# Patient Record
Sex: FEMALE | Race: WHITE | NOT HISPANIC OR LATINO | Employment: STUDENT | URBAN - METROPOLITAN AREA
[De-identification: names, ages, dates, MRNs, and addresses within clinical notes are randomized per-mention and may not be internally consistent; named-entity substitution may affect disease eponyms.]

---

## 2024-09-03 ENCOUNTER — TELEPHONE (OUTPATIENT)
Age: 29
End: 2024-09-03

## 2024-09-03 ENCOUNTER — PATIENT MESSAGE (OUTPATIENT)
Dept: INTERNAL MEDICINE CLINIC | Facility: OTHER | Age: 29
End: 2024-09-03

## 2024-09-03 DIAGNOSIS — F90.0 ATTENTION DEFICIT HYPERACTIVITY DISORDER (ADHD), PREDOMINANTLY INATTENTIVE TYPE: Primary | ICD-10-CM

## 2024-09-03 DIAGNOSIS — F90.0 ATTENTION DEFICIT HYPERACTIVITY DISORDER (ADHD), PREDOMINANTLY INATTENTIVE TYPE: ICD-10-CM

## 2024-09-03 DIAGNOSIS — F41.9 ANXIETY: ICD-10-CM

## 2024-09-03 RX ORDER — ESCITALOPRAM OXALATE 10 MG/1
10 TABLET ORAL DAILY
Qty: 90 TABLET | Refills: 1 | Status: SHIPPED | OUTPATIENT
Start: 2024-09-03 | End: 2025-03-02

## 2024-09-03 RX ORDER — LISDEXAMFETAMINE DIMESYLATE 50 MG/1
50 CAPSULE ORAL EVERY MORNING
Qty: 15 CAPSULE | Refills: 0 | Status: SHIPPED | OUTPATIENT
Start: 2024-09-03

## 2024-09-03 NOTE — TELEPHONE ENCOUNTER
Medication: escitalopram (LEXAPRO) 10 mg tablet     Dose/Frequency: Take 1 tablet (10 mg total) by mouth daily     Quantity:  90 tablet     Pharmacy: [facility] phone [phone]  Fax [phone]  (Please do not send this rx to [facility], ins called to inform her she is unable to have this med sent to [facility], it must go to [facility], thank you.)    Office:   [x] PCP/Provider -   [] Speciality/Provider -     Does the patient have enough for 3 days?   [] Yes   [x] No - Send as HP to POD

## 2024-09-03 NOTE — TELEPHONE ENCOUNTER
Pt is requesting a different alternative for amphetamine-dextroamphetamine (ADDERALL, 30MG,) 30 MG tablet as she has been told numerous times that it is on back order for several weeks. Pt asking if Vyvanse can be prescribed as it is in the same class. Pt took her last Adderall on 9/2 and needs rx.   Pt also requesting a call back to discuss [phone]   Pt stated rx needs to be sent to [facility] per insurance, not [facility].

## 2024-09-11 ENCOUNTER — APPOINTMENT (OUTPATIENT)
Dept: LAB | Facility: CLINIC | Age: 29
End: 2024-09-11

## 2024-09-11 DIAGNOSIS — Z00.8 HEALTH EXAMINATION IN POPULATION SURVEYS: ICD-10-CM

## 2024-09-11 LAB
CHOLEST SERPL-MCNC: 169 MG/DL
EST. AVERAGE GLUCOSE BLD GHB EST-MCNC: 97 MG/DL
HBA1C MFR BLD: 5 %
HDLC SERPL-MCNC: 60 MG/DL
LDLC SERPL CALC-MCNC: 84 MG/DL (ref 0–100)
NONHDLC SERPL-MCNC: 109 MG/DL
TRIGL SERPL-MCNC: 126 MG/DL

## 2024-09-11 PROCEDURE — 36415 COLL VENOUS BLD VENIPUNCTURE: CPT

## 2024-09-11 PROCEDURE — 80061 LIPID PANEL: CPT

## 2024-09-11 PROCEDURE — 83036 HEMOGLOBIN GLYCOSYLATED A1C: CPT

## 2024-10-02 RX ORDER — DEXTROAMPHETAMINE SACCHARATE, AMPHETAMINE ASPARTATE, DEXTROAMPHETAMINE SULFATE AND AMPHETAMINE SULFATE 7.5; 7.5; 7.5; 7.5 MG/1; MG/1; MG/1; MG/1
30 TABLET ORAL 2 TIMES DAILY
Qty: 60 TABLET | Refills: 0 | Status: SHIPPED | OUTPATIENT
Start: 2024-10-02

## 2024-10-30 DIAGNOSIS — F90.0 ATTENTION DEFICIT HYPERACTIVITY DISORDER (ADHD), PREDOMINANTLY INATTENTIVE TYPE: ICD-10-CM

## 2024-10-31 RX ORDER — DEXTROAMPHETAMINE SACCHARATE, AMPHETAMINE ASPARTATE, DEXTROAMPHETAMINE SULFATE AND AMPHETAMINE SULFATE 7.5; 7.5; 7.5; 7.5 MG/1; MG/1; MG/1; MG/1
30 TABLET ORAL 2 TIMES DAILY
Qty: 60 TABLET | Refills: 0 | Status: SHIPPED | OUTPATIENT
Start: 2024-10-31

## 2024-11-22 ENCOUNTER — ANNUAL EXAM (OUTPATIENT)
Dept: OBGYN CLINIC | Facility: CLINIC | Age: 29
End: 2024-11-22
Payer: COMMERCIAL

## 2024-11-22 VITALS
DIASTOLIC BLOOD PRESSURE: 80 MMHG | WEIGHT: 131.8 LBS | SYSTOLIC BLOOD PRESSURE: 122 MMHG | BODY MASS INDEX: 26.57 KG/M2 | HEIGHT: 59 IN

## 2024-11-22 DIAGNOSIS — Z01.419 WELL WOMAN EXAM WITH ROUTINE GYNECOLOGICAL EXAM: Primary | ICD-10-CM

## 2024-11-22 PROCEDURE — S0612 ANNUAL GYNECOLOGICAL EXAMINA: HCPCS | Performed by: PHYSICIAN ASSISTANT

## 2024-11-22 NOTE — PROGRESS NOTES
Assessment   29 y.o.  presenting for annual exam.     Plan   Diagnoses and all orders for this visit:    Well woman exam with routine gynecological exam        Pap up to date    Perineal hygiene reviewed. Weight bearing exercises minium of 150 mins/weekly advised. Kegel exercises recommended.   SBE encouraged, A yearly mammogram is recommended for breast cancer screening starting at age 40. ASCCP guidelines reviewed. Condoms encouraged with all sexual activity to prevent STI's. Gardisil vaccines recommended up to age 45. Calcium/ Vit D dietary requirements discussed.   Advised to call with any issues, all concerns & questions addressed.   See provided information in your after visit summary     F/U Annually and PRN    Results will be released to [facility], if abnormal will call or message to review and discuss treatment plan.     __________________________________________________________________    Subjective     Oliva Storey is a 29 y.o.  presenting for annual exam. She is without complaint and does not want STD testing today.     SCREENING  Last Pap: 2023 negative     GYN  The patient's menstrual history is as follows:    ;  ; Period Cycle (Days): 28; Period Duration (Days): 4-5; Period Pattern: Regular; Menstrual Flow: Heavy; Dysmenorrhea: (!) Moderate; Dysmenorrhea Symptoms: Cramping, Nausea, Headache    Sexually active: Yes - single partner - female  Contraception: none   Hx STI: denies     Hx Abnormal pap: denies  We reviewed ASCCP guidelines for Pap testing today.  Gardasil: She is unsure if she completed the Gardasil series.    Denies vaginal discharge, itching, odor, dyspareunia, pelvic pain and vulvar/vaginal symptoms      OB           Complaints: denies   Denies urgency, frequency, hematuria, leakage / change in stream, difficulty urinating.       BREAST  Complaints: denies   Denies: breast lump, breast tenderness, nipple discharge, skin color change, and skin lesion(s)  Personal  hx: none      Pertinent Family Hx:   Family hx of breast cancer: no  Family hx of ovarian cancer: no  Family hx of colon cancer: no      GENERAL  PMH reviewed/updated and is as below.   Patient does follow with a PCP.    SOCIAL  Smoking: no  Alcohol:occasional   Drug: no  Occupation: PT[employer]       Past Medical History:   Diagnosis Date    Allergic 12/01/21    Anxiety 2010       History reviewed. No pertinent surgical history.      Current Outpatient Medications:     amphetamine-dextroamphetamine (ADDERALL, 30MG,) 30 MG tablet, Take 1 tablet (30 mg total) by mouth 2 (two) times a day Max Daily Amount: 60 mg, Disp: 60 tablet, Rfl: 0    escitalopram (LEXAPRO) 10 mg tablet, Take 1 tablet (10 mg total) by mouth daily, Disp: 90 tablet, Rfl: 1    fluticasone (FLONASE) 50 mcg/act nasal spray, 1 spray into each nostril as needed in the morning for rhinitis or allergies., Disp: 18 mL, Rfl: 3    ibuprofen (MOTRIN) 200 mg tablet, Take by mouth every 6 (six) hours as needed for mild pain, Disp: , Rfl:     montelukast (SINGULAIR) 10 mg tablet, Take 1 tablet (10 mg total) by mouth daily at bedtime, Disp: 30 tablet, Rfl: 5    No Known Allergies    Social History     Socioeconomic History    Marital status: /Civil Union     Spouse name: Not on file    Number of children: Not on file    Years of education: Not on file    Highest education level: Not on file   Occupational History    Not on file   Tobacco Use    Smoking status: Never    Smokeless tobacco: Never   Vaping Use    Vaping status: Never Used   Substance and Sexual Activity    Alcohol use: Yes     Comment: occassional    Drug use: Never    Sexual activity: Yes     Partners: Female     Birth control/protection: None   Other Topics Concern    Not on file   Social History Narrative    Not on file     Social Drivers of Health     Financial Resource Strain: Not on file   Food Insecurity: Not on file   Transportation Needs: Not on file   Physical Activity:  "Not on file   Stress: Not on file   Social Connections: Not on file   Intimate Partner Violence: Not on file   Housing Stability: Not on file       Review of Systems     ROS:  Constitutional: Negative for fatigue and unexpected weight change.   Respiratory: Negative for cough and shortness of breath.    Cardiovascular: Negative for chest pain and palpitations.   Gastrointestinal: Negative for abdominal pain and change in bowel habits  Breasts:  Negative, other than as noted above.   Genitourinary: Negative, other than as noted above.   Psychiatric: Negative for mood difficulties.      Objective      /80 (BP Location: Left arm, Patient Position: Sitting, Cuff Size: Standard)   Ht 4' 11\" (1.499 m)   Wt 59.8 kg (131 lb 12.8 oz)   LMP 11/01/2024 (Exact Date)   BMI 26.62 kg/m²     Physical Examination:    Patient appears well and is not in distress  Neck is supple without masses, no cervical or supraclavicular lymphadenopathy  Cardiovascular: regular rate and rhythm; no murmurs  Lungs: clear to auscultation bilaterally; no wheezes  Breasts are symmetrical without mass, tenderness, nipple discharge, skin changes or adenopathy.   Abdomen is soft and nontender without masses.   External genitals are normal without lesions or rashes.  Urethral meatus and urethra are normal  Bladder is normal to palpation  Vagina is normal without discharge or bleeding.   Cervix is normal without discharge or lesion.   Uterus is normal, mobile, nontender without palpable mass.  Adnexa are normal, nontender, without palpable mass.                 "

## 2024-12-02 DIAGNOSIS — F41.9 ANXIETY: ICD-10-CM

## 2024-12-02 DIAGNOSIS — F90.0 ATTENTION DEFICIT HYPERACTIVITY DISORDER (ADHD), PREDOMINANTLY INATTENTIVE TYPE: ICD-10-CM

## 2024-12-02 RX ORDER — ESCITALOPRAM OXALATE 10 MG/1
10 TABLET ORAL DAILY
Qty: 90 TABLET | Refills: 1 | Status: SHIPPED | OUTPATIENT
Start: 2024-12-02 | End: 2025-05-31

## 2024-12-03 RX ORDER — DEXTROAMPHETAMINE SACCHARATE, AMPHETAMINE ASPARTATE, DEXTROAMPHETAMINE SULFATE AND AMPHETAMINE SULFATE 7.5; 7.5; 7.5; 7.5 MG/1; MG/1; MG/1; MG/1
30 TABLET ORAL 2 TIMES DAILY
Qty: 60 TABLET | Refills: 0 | Status: SHIPPED | OUTPATIENT
Start: 2024-12-03

## 2024-12-17 ENCOUNTER — OFFICE VISIT (OUTPATIENT)
Dept: INTERNAL MEDICINE CLINIC | Facility: OTHER | Age: 29
End: 2024-12-17
Payer: COMMERCIAL

## 2024-12-17 VITALS
HEIGHT: 59 IN | OXYGEN SATURATION: 100 % | HEART RATE: 85 BPM | WEIGHT: 130 LBS | SYSTOLIC BLOOD PRESSURE: 120 MMHG | TEMPERATURE: 98.2 F | BODY MASS INDEX: 26.21 KG/M2 | DIASTOLIC BLOOD PRESSURE: 78 MMHG | RESPIRATION RATE: 18 BRPM

## 2024-12-17 DIAGNOSIS — I73.00 RAYNAUD'S PHENOMENON WITHOUT GANGRENE: ICD-10-CM

## 2024-12-17 DIAGNOSIS — F90.0 ATTENTION DEFICIT HYPERACTIVITY DISORDER (ADHD), PREDOMINANTLY INATTENTIVE TYPE: ICD-10-CM

## 2024-12-17 DIAGNOSIS — E80.4 GILBERT SYNDROME: ICD-10-CM

## 2024-12-17 DIAGNOSIS — F41.9 ANXIETY: Primary | ICD-10-CM

## 2024-12-17 DIAGNOSIS — J30.9 ALLERGIC RHINITIS, UNSPECIFIED SEASONALITY, UNSPECIFIED TRIGGER: ICD-10-CM

## 2024-12-17 PROCEDURE — 99214 OFFICE O/P EST MOD 30 MIN: CPT | Performed by: INTERNAL MEDICINE

## 2024-12-17 RX ORDER — ESCITALOPRAM OXALATE 10 MG/1
15 TABLET ORAL DAILY
Start: 2024-12-17 | End: 2025-06-15

## 2024-12-17 NOTE — PROGRESS NOTES
Name: Oliva Storey      : 1995      MRN: [id number]  Encounter Provider: Raji Lou MD  Encounter Date: 2024   Encounter department: [facility]  :  Assessment & Plan  Anxiety  Will continue Lexapro at 15 mg daily.   Orders:  •  escitalopram (LEXAPRO) 10 mg tablet; Take 1.5 tablets (15 mg total) by mouth daily  •  Comprehensive metabolic panel; Future    Attention deficit hyperactivity disorder (ADHD), predominantly inattentive type  Continue Adderall 30 mg BID.   Orders:  •  Comprehensive metabolic panel; Future    Raynaud's phenomenon without gangrene  Will check an NURYS.  Discussed keeping extremities warm.  Orders:  •  Comprehensive metabolic panel; Future  •  NURYS Screen w/Reflex Cascade; Future    Gilbert syndrome  Continue to trend LFTs.  Orders:  •  Comprehensive metabolic panel; Future    Allergic rhinitis, unspecified seasonality, unspecified trigger  Controlled with current regimen.               History of Present Illness     Oliva Storey is seen today for follow up of chronic conditions.   Recent laboratory studies reviewed today with the patient.   she has been compliant with her medication regimen.   ADHD: controlled with Adderall 30 mg daily.   She would like to ultimately wean off lexapro. She has noticed slight increase in anxiety when she decreased lexapro from 20 mg to 10 mg.    She has noticed occasional raynaud's during cold temperature exposure.   she has no complaints or concerns at this time.       Anxiety  Presents for follow-up visit. Patient reports no chest pain, dizziness, nausea, palpitations, shortness of breath or suicidal ideas. Symptoms occur occasionally. The severity of symptoms is mild. The quality of sleep is good. Nighttime awakenings: none.     Compliance with medications is %.     Review of Systems   Constitutional:  Negative for activity change, appetite change, chills, diaphoresis, fatigue and fever.   HENT:   "Negative for congestion, postnasal drip, rhinorrhea, sinus pressure, sinus pain, sneezing and sore throat.    Eyes:  Negative for visual disturbance.   Respiratory:  Negative for apnea, cough, choking, chest tightness, shortness of breath and wheezing.    Cardiovascular:  Negative for chest pain, palpitations and leg swelling.   Gastrointestinal:  Negative for abdominal distention, abdominal pain, anal bleeding, blood in stool, constipation, diarrhea, nausea and vomiting.   Endocrine: Negative for cold intolerance and heat intolerance.   Genitourinary:  Negative for difficulty urinating, dysuria and hematuria.   Musculoskeletal: Negative.    Skin: Negative.    Neurological:  Negative for dizziness, weakness, light-headedness, numbness and headaches.   Hematological:  Negative for adenopathy.   Psychiatric/Behavioral:  Negative for agitation, sleep disturbance and suicidal ideas.    All other systems reviewed and are negative.      Objective   /78 (BP Location: Left arm, Patient Position: Sitting, Cuff Size: Standard)   Pulse 85   Temp 98.2 °F (36.8 °C) (Temporal)   Resp 18   Ht 4' 11\" (1.499 m)   Wt 59 kg (130 lb)   LMP 11/01/2024 (Exact Date)   SpO2 100%   BMI 26.26 kg/m²      Physical Exam  Vitals and nursing note reviewed.   Constitutional:       General: She is not in acute distress.     Appearance: She is well-developed. She is not diaphoretic.   HENT:      Head: Normocephalic and atraumatic.   Eyes:      General:         Right eye: No discharge.         Left eye: No discharge.      Conjunctiva/sclera: Conjunctivae normal.      Pupils: Pupils are equal, round, and reactive to light.   Neck:      Thyroid: No thyromegaly.      Vascular: No JVD.   Cardiovascular:      Rate and Rhythm: Normal rate and regular rhythm.      Heart sounds: Normal heart sounds. No murmur heard.     No friction rub. No gallop.   Pulmonary:      Effort: Pulmonary effort is normal. No respiratory distress.      Breath sounds: " Normal breath sounds. No wheezing or rales.   Chest:      Chest wall: No tenderness.   Abdominal:      General: There is no distension.      Palpations: Abdomen is soft.      Tenderness: There is no abdominal tenderness.   Musculoskeletal:         General: No tenderness or deformity. Normal range of motion.      Cervical back: Normal range of motion and neck supple.   Lymphadenopathy:      Cervical: No cervical adenopathy.   Skin:     General: Skin is warm and dry.      Coloration: Skin is not pale.      Findings: No erythema or rash.   Neurological:      Mental Status: She is alert and oriented to person, place, and time.      Cranial Nerves: No cranial nerve deficit.      Coordination: Coordination normal.   Psychiatric:         Behavior: Behavior normal.         Thought Content: Thought content normal.         Judgment: Judgment normal.

## 2024-12-17 NOTE — ASSESSMENT & PLAN NOTE
Will continue Lexapro at 15 mg daily.   Orders:  •  escitalopram (LEXAPRO) 10 mg tablet; Take 1.5 tablets (15 mg total) by mouth daily  •  Comprehensive metabolic panel; Future

## 2024-12-31 DIAGNOSIS — F90.0 ATTENTION DEFICIT HYPERACTIVITY DISORDER (ADHD), PREDOMINANTLY INATTENTIVE TYPE: ICD-10-CM

## 2025-01-02 RX ORDER — DEXTROAMPHETAMINE SACCHARATE, AMPHETAMINE ASPARTATE, DEXTROAMPHETAMINE SULFATE AND AMPHETAMINE SULFATE 7.5; 7.5; 7.5; 7.5 MG/1; MG/1; MG/1; MG/1
30 TABLET ORAL 2 TIMES DAILY
Qty: 60 TABLET | Refills: 0 | Status: SHIPPED | OUTPATIENT
Start: 2025-01-02

## 2025-01-29 DIAGNOSIS — F90.0 ATTENTION DEFICIT HYPERACTIVITY DISORDER (ADHD), PREDOMINANTLY INATTENTIVE TYPE: ICD-10-CM

## 2025-01-29 RX ORDER — DEXTROAMPHETAMINE SACCHARATE, AMPHETAMINE ASPARTATE, DEXTROAMPHETAMINE SULFATE AND AMPHETAMINE SULFATE 7.5; 7.5; 7.5; 7.5 MG/1; MG/1; MG/1; MG/1
30 TABLET ORAL 2 TIMES DAILY
Qty: 60 TABLET | Refills: 0 | Status: SHIPPED | OUTPATIENT
Start: 2025-01-29

## 2025-02-27 DIAGNOSIS — F90.0 ATTENTION DEFICIT HYPERACTIVITY DISORDER (ADHD), PREDOMINANTLY INATTENTIVE TYPE: ICD-10-CM

## 2025-02-27 RX ORDER — DEXTROAMPHETAMINE SACCHARATE, AMPHETAMINE ASPARTATE, DEXTROAMPHETAMINE SULFATE AND AMPHETAMINE SULFATE 7.5; 7.5; 7.5; 7.5 MG/1; MG/1; MG/1; MG/1
30 TABLET ORAL 2 TIMES DAILY
Qty: 60 TABLET | Refills: 0 | Status: SHIPPED | OUTPATIENT
Start: 2025-02-27

## 2025-03-24 DIAGNOSIS — F90.0 ATTENTION DEFICIT HYPERACTIVITY DISORDER (ADHD), PREDOMINANTLY INATTENTIVE TYPE: ICD-10-CM

## 2025-03-24 DIAGNOSIS — F41.9 ANXIETY: ICD-10-CM

## 2025-03-25 RX ORDER — ESCITALOPRAM OXALATE 10 MG/1
15 TABLET ORAL DAILY
Qty: 45 TABLET | Refills: 0 | Status: SHIPPED | OUTPATIENT
Start: 2025-03-25 | End: 2025-09-21

## 2025-03-26 ENCOUNTER — TELEPHONE (OUTPATIENT)
Dept: INTERNAL MEDICINE CLINIC | Facility: OTHER | Age: 30
End: 2025-03-26

## 2025-03-26 RX ORDER — DEXTROAMPHETAMINE SACCHARATE, AMPHETAMINE ASPARTATE, DEXTROAMPHETAMINE SULFATE AND AMPHETAMINE SULFATE 7.5; 7.5; 7.5; 7.5 MG/1; MG/1; MG/1; MG/1
30 TABLET ORAL 2 TIMES DAILY
Qty: 60 TABLET | Refills: 0 | Status: SHIPPED | OUTPATIENT
Start: 2025-03-26

## 2025-03-27 NOTE — TELEPHONE ENCOUNTER
PA for escitalopram (LEXAPRO) 10 mg tablet APPROVED     Date(s) approved 03/26/25-03/26/26    Case # [id number]    Patient advised by          [][employer] Message  []Phone call   [x]LMOM  []L/M to call office as no active Communication consent on file  []Unable to leave detailed message as VM not approved on Communication consent       Pharmacy advised by    []Fax  []Phone call  []Secure Chat     Approval letter scanned into Media No , not available at time of approval      
PA for escitalopram (LEXAPRO) 10 mg tablet SUBMITTED to [facility]    via    []CMM-KEY:   [x]Surescripts-Case ID # [id number]   []Availity-Auth ID # NDC #   []Faxed to plan   []Other website   []Phone call Case ID #     [x]PA sent as URGENT    All office notes, labs and other pertaining documents and studies sent. Clinical questions answered. Awaiting determination from insurance company.     Turnaround time for your insurance to make a decision on your Prior Authorization can take 7-21 business days.                 
Received prior authorization request from [facility] for the following medication:    escitalopram (LEXAPRO) 10 mg tablet [[id number]]    Order Details  Dose: 15 mg Route: Oral Frequency: Daily   Dispense Quantity: 45 tablet Refills: 0          Sig: Take 1.5 tablets (15 mg total) by mouth daily         Start Date: 03/25/25 End Date: 09/21/25 after 180 doses   Written Date: 03/25/25 Expiration Date: 03/25/26       Associated Diagnoses: Anxiety [F41.9]   Original Order: escitalopram (LEXAPRO) 10 mg tablet [[id number]]   Providers    Ordering and Authorizing Provider:  Raji Lou MD  [address] [place] [id number]  Phone: [phone]   Fax: [phone]  NATY #: [id number]   NPI: [id number]        Ordering User: Wanda Ann RN          Pharmacy    [facility] - [place] - [address]  [address], [place] [address]  Phone: [phone]  Fax: [phone]     
show

## 2025-04-21 DIAGNOSIS — F90.0 ATTENTION DEFICIT HYPERACTIVITY DISORDER (ADHD), PREDOMINANTLY INATTENTIVE TYPE: ICD-10-CM

## 2025-04-24 RX ORDER — DEXTROAMPHETAMINE SACCHARATE, AMPHETAMINE ASPARTATE, DEXTROAMPHETAMINE SULFATE AND AMPHETAMINE SULFATE 7.5; 7.5; 7.5; 7.5 MG/1; MG/1; MG/1; MG/1
30 TABLET ORAL 2 TIMES DAILY
Qty: 60 TABLET | Refills: 0 | Status: SHIPPED | OUTPATIENT
Start: 2025-04-24

## 2025-04-24 NOTE — TELEPHONE ENCOUNTER
Patient called to request a refill for their Adderall  advised a refill was requested on 4/21/25 and is pending approval. Patient verbalized understanding and is in agreement.     Does the patient have enough for 3 days?   [] Yes   [x] No - Send as HP to POD

## 2025-05-16 DIAGNOSIS — F41.9 ANXIETY: ICD-10-CM

## 2025-05-16 DIAGNOSIS — F90.0 ATTENTION DEFICIT HYPERACTIVITY DISORDER (ADHD), PREDOMINANTLY INATTENTIVE TYPE: ICD-10-CM

## 2025-05-16 RX ORDER — ESCITALOPRAM OXALATE 10 MG/1
15 TABLET ORAL DAILY
Qty: 135 TABLET | Refills: 1 | Status: SHIPPED | OUTPATIENT
Start: 2025-05-16 | End: 2025-11-12

## 2025-05-16 RX ORDER — DEXTROAMPHETAMINE SACCHARATE, AMPHETAMINE ASPARTATE, DEXTROAMPHETAMINE SULFATE AND AMPHETAMINE SULFATE 7.5; 7.5; 7.5; 7.5 MG/1; MG/1; MG/1; MG/1
30 TABLET ORAL 2 TIMES DAILY
Qty: 60 TABLET | Refills: 0 | OUTPATIENT
Start: 2025-05-16

## 2025-05-22 DIAGNOSIS — F90.0 ATTENTION DEFICIT HYPERACTIVITY DISORDER (ADHD), PREDOMINANTLY INATTENTIVE TYPE: ICD-10-CM

## 2025-05-22 RX ORDER — DEXTROAMPHETAMINE SACCHARATE, AMPHETAMINE ASPARTATE, DEXTROAMPHETAMINE SULFATE AND AMPHETAMINE SULFATE 7.5; 7.5; 7.5; 7.5 MG/1; MG/1; MG/1; MG/1
30 TABLET ORAL 2 TIMES DAILY
Qty: 60 TABLET | Refills: 0 | Status: SHIPPED | OUTPATIENT
Start: 2025-05-22

## 2025-05-22 NOTE — TELEPHONE ENCOUNTER
PROVIDERS,   PLEASE ADDRESS SINCE PCP IS OUT OF THE OFFICE.   THANK YOU!      Next Office Visit 06/17/2025

## 2025-05-22 NOTE — TELEPHONE ENCOUNTER
Patient explained that she spilled water in her open bottle of pills and that is why she requested her refill early (refill encounter 05/16/25). She has been without med for 6 days and is hoping to be able to get this refill today, 05/22/25.    Reason for call:   [x] Refill   [] Prior Auth  [] Other:     Office:   [x] PCP/Provider - [facility] / [place]    Medication: generic Adderall     Dose/Frequency: 30mg bid    Quantity: 60    Pharmacy: [facility] - [place] - [address]     Local Pharmacy   Does the patient have enough for 3 days?   [] Yes   [x] No - Send as HP to POD    Mail Away Pharmacy   Does the patient have enough for 10 days?   [] Yes   [] No - Send as HP to POD

## 2025-06-18 DIAGNOSIS — F90.0 ATTENTION DEFICIT HYPERACTIVITY DISORDER (ADHD), PREDOMINANTLY INATTENTIVE TYPE: ICD-10-CM

## 2025-06-18 NOTE — TELEPHONE ENCOUNTER
Next Office Visit needs appointment did not show yesterday. Sent a my chart message to call for a reschedule       PDMP checked       Due to fill on Friday 6/20/25

## 2025-06-18 NOTE — TELEPHONE ENCOUNTER
Oliva returned call to reschedule missed appointment, is scheduled for 7/24. Requesting for the refills to be sent so she is not without until that appointment.    Please reach out to inform that it has been sent the pharmacy or for any further concerns.    Please advise.

## 2025-06-19 RX ORDER — DEXTROAMPHETAMINE SACCHARATE, AMPHETAMINE ASPARTATE, DEXTROAMPHETAMINE SULFATE AND AMPHETAMINE SULFATE 7.5; 7.5; 7.5; 7.5 MG/1; MG/1; MG/1; MG/1
30 TABLET ORAL 2 TIMES DAILY
Qty: 60 TABLET | Refills: 0 | Status: SHIPPED | OUTPATIENT
Start: 2025-06-19

## 2025-07-18 DIAGNOSIS — F90.0 ATTENTION DEFICIT HYPERACTIVITY DISORDER (ADHD), PREDOMINANTLY INATTENTIVE TYPE: ICD-10-CM

## 2025-07-21 RX ORDER — DEXTROAMPHETAMINE SACCHARATE, AMPHETAMINE ASPARTATE, DEXTROAMPHETAMINE SULFATE AND AMPHETAMINE SULFATE 7.5; 7.5; 7.5; 7.5 MG/1; MG/1; MG/1; MG/1
30 TABLET ORAL 2 TIMES DAILY
Qty: 60 TABLET | Refills: 0 | Status: SHIPPED | OUTPATIENT
Start: 2025-07-21
